# Patient Record
Sex: MALE | Race: BLACK OR AFRICAN AMERICAN | NOT HISPANIC OR LATINO | Employment: FULL TIME | URBAN - METROPOLITAN AREA
[De-identification: names, ages, dates, MRNs, and addresses within clinical notes are randomized per-mention and may not be internally consistent; named-entity substitution may affect disease eponyms.]

---
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
## 2023-07-25 ENCOUNTER — HOSPITAL ENCOUNTER (EMERGENCY)
Facility: CLINIC | Age: 23
End: 2023-07-25

## 2023-07-25 ENCOUNTER — HOSPITAL ENCOUNTER (EMERGENCY)
Facility: CLINIC | Age: 23
Discharge: HOME | End: 2023-07-26
Attending: EMERGENCY MEDICINE | Admitting: EMERGENCY MEDICINE
Payer: COMMERCIAL

## 2023-07-25 VITALS
OXYGEN SATURATION: 99 % | RESPIRATION RATE: 16 BRPM | BODY MASS INDEX: 21.66 KG/M2 | DIASTOLIC BLOOD PRESSURE: 67 MMHG | SYSTOLIC BLOOD PRESSURE: 124 MMHG | WEIGHT: 154.7 LBS | HEART RATE: 53 BPM | TEMPERATURE: 98.9 F | HEIGHT: 71 IN

## 2023-07-25 DIAGNOSIS — S69.92XA INJURY OF FINGER OF LEFT HAND, INITIAL ENCOUNTER: ICD-10-CM

## 2023-07-25 DIAGNOSIS — S69.92XA INJURY OF LEFT HAND, INITIAL ENCOUNTER: Primary | ICD-10-CM
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
## 2023-07-25 PROCEDURE — 99284 EMERGENCY DEPT VISIT MOD MDM: CPT | Mod: 25 | Performed by: EMERGENCY MEDICINE

## 2023-07-25 PROCEDURE — 29130 APPL FINGER SPLINT STATIC: CPT | Mod: F4 | Performed by: EMERGENCY MEDICINE

## 2023-07-25 PROCEDURE — 99283 EMERGENCY DEPT VISIT LOW MDM: CPT | Performed by: EMERGENCY MEDICINE

## 2023-07-25 ASSESSMENT — ACTIVITIES OF DAILY LIVING (ADL): ADLS_ACUITY_SCORE: 33

## 2023-07-26 ENCOUNTER — APPOINTMENT (OUTPATIENT)
Dept: GENERAL RADIOLOGY | Facility: CLINIC | Age: 23
End: 2023-07-26
Attending: EMERGENCY MEDICINE
Payer: COMMERCIAL

## 2023-07-26 PROCEDURE — 73140 X-RAY EXAM OF FINGER(S): CPT | Mod: LT
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
## 2023-07-26 NOTE — DISCHARGE INSTRUCTIONS
Use the splint. You can use tylenol or ibuprofen as needed for pain.     Please make an appointment to follow up with [facility]opedics Clinic - hand doctor (phone: [phone]) within a week. You may return with any worsening or concern.

## 2023-07-26 NOTE — ED TRIAGE NOTES
Triage Assessment       Row Name 07/25/23 4747       Respiratory WDL    Respiratory WDL WDL       Skin Circulation/Temperature WDL    Skin Circulation/Temperature WDL WDL

## 2023-07-27 ENCOUNTER — DOCUMENTATION ONLY (OUTPATIENT)
Dept: OTHER | Facility: CLINIC | Age: 23
End: 2023-07-27
Payer: COMMERCIAL

## 2023-12-23 ENCOUNTER — HOSPITAL ENCOUNTER (EMERGENCY)
Facility: CLINIC | Age: 23
Discharge: HOME | End: 2023-12-23
Attending: EMERGENCY MEDICINE | Admitting: EMERGENCY MEDICINE
Payer: COMMERCIAL

## 2023-12-23 VITALS
TEMPERATURE: 98.6 F | BODY MASS INDEX: 22.4 KG/M2 | HEART RATE: 58 BPM | OXYGEN SATURATION: 100 % | WEIGHT: 160 LBS | SYSTOLIC BLOOD PRESSURE: 121 MMHG | RESPIRATION RATE: 16 BRPM | HEIGHT: 71 IN | DIASTOLIC BLOOD PRESSURE: 77 MMHG

## 2023-12-23 DIAGNOSIS — B34.9 VIRAL SYNDROME: ICD-10-CM

## 2023-12-23 LAB
FLUAV RNA SPEC QL NAA+PROBE: NEGATIVE
FLUBV RNA RESP QL NAA+PROBE: NEGATIVE
RSV RNA SPEC NAA+PROBE: NEGATIVE
SARS-COV-2 RNA RESP QL NAA+PROBE: NEGATIVE

## 2023-12-23 PROCEDURE — 250N000011 HC RX IP 250 OP 636: Performed by: EMERGENCY MEDICINE

## 2023-12-23 PROCEDURE — 87637 SARSCOV2&INF A&B&RSV AMP PRB: CPT | Performed by: INTERNAL MEDICINE

## 2023-12-23 PROCEDURE — 99283 EMERGENCY DEPT VISIT LOW MDM: CPT | Performed by: EMERGENCY MEDICINE

## 2023-12-23 RX ORDER — ONDANSETRON 4 MG/1
4 TABLET, ORALLY DISINTEGRATING ORAL ONCE
Status: COMPLETED | OUTPATIENT
Start: 2023-12-23 | End: 2023-12-23

## 2023-12-23 RX ADMIN — ONDANSETRON 4 MG: 4 TABLET, ORALLY DISINTEGRATING ORAL at 17:19

## 2023-12-23 ASSESSMENT — ACTIVITIES OF DAILY LIVING (ADL): ADLS_ACUITY_SCORE: 35

## 2023-12-23 NOTE — ED PROVIDER NOTES
ED Provider Note  [facility]      History     Chief Complaint   Patient presents with    Fever     102s for the past 2 days    Cough     HPI  Gatito Luna is a 23 year old male who presents to the emergency department seeking evaluation of cold & Flu symptoms. She reports that beginning 2 days ago she has been experiencing chest pain, sore throat, runny nose, coughing, shortness of breath and fever. He reports that his fever was up to 102F. He is not vaccinated against Covid-19. He states that he has had Covid-19 in the past. He also adds that he has been vomiting, with 1 episode today. He has been taking tylenol and Advil to manage his symptoms, but has not found significant relief.        Past Medical History  Past Medical History:   Diagnosis Date    DENTAL CARIES/ MULTIPLE 11/17/2006    Wheezing 1/31/03     History reviewed. No pertinent surgical history.  ACETAMINOPHEN 160 MG/5ML OR LIQD  ibuprofen (ADVIL,MOTRIN) 200 MG tablet  cyclobenzaprine (FLEXERIL) 10 MG tablet  IBUPROFEN 100 MG/5ML OR SUSP  IBUPROFEN PO  oxyCODONE (ROXICODONE) 5 MG immediate release tablet      Allergies   Allergen Reactions    Shrimp Swelling     Family History  History reviewed. No pertinent family history.  Social History   Social History     Tobacco Use    Smoking status: Never   Substance Use Topics    Alcohol use: Not Currently    Drug use: Never      Past medical history, past surgical history, medications, allergies, family history, and social history were reviewed with the patient. No additional pertinent items.      A medically appropriate review of systems was performed with pertinent positives and negatives noted in the HPI, and all other systems negative.    Physical Exam      Physical Exam  Constitutional:       General: He is not in acute distress.     Appearance: He is not ill-appearing, toxic-appearing or diaphoretic.   HENT:      Head: Normocephalic and atraumatic.      Nose: Congestion  present.      Mouth/Throat:      Pharynx: No oropharyngeal exudate or posterior oropharyngeal erythema.   Cardiovascular:      Pulses: Normal pulses.   Pulmonary:      Effort: Pulmonary effort is normal. No respiratory distress.      Breath sounds: No wheezing.      Comments: Sats 100%  Neurological:      General: No focal deficit present.      Mental Status: He is oriented to person, place, and time.   Psychiatric:         Mood and Affect: Mood normal.         Behavior: Behavior normal.         Thought Content: Thought content normal.           ED Course, Procedures, & Data      Procedures               Results for orders placed or performed during the hospital encounter of 12/23/23   Symptomatic Influenza A/B, RSV, & SARS-CoV2 PCR (COVID-19) Nasopharyngeal     Status: Normal    Specimen: Nasopharyngeal; Swab   Result Value Ref Range    Influenza A PCR Negative Negative    Influenza B PCR Negative Negative    RSV PCR Negative Negative    SARS CoV2 PCR Negative Negative    Narrative    Testing was performed using the Xpert Xpress CoV2/Flu/RSV Assay on the [employer] GeneXpert Instrument. This test should be ordered for the detection of SARS-CoV-2, influenza, and RSV viruses in individuals who meet clinical and/or epidemiological criteria. Test performance is unknown in asymptomatic patients. This test is for in vitro diagnostic use under the FDA EUA for laboratories certified under CLIA to perform high or moderate complexity testing. This test has not been FDA cleared or approved. A negative result does not rule out the presence of PCR inhibitors in the specimen or target RNA in concentration below the limit of detection for the assay. If only one viral target is positive but coinfection with multiple targets is suspected, the sample should be re-tested with another FDA cleared, approved, or authorized test, if coinfection would change clinical management. This test was validated by the [facility].  These laboratories are certified under the Clinical Laboratory Improvement Amendments of 1988 (CLIA-88) as qualified to perform high complexity laboratory testing.     Medications   ondansetron (ZOFRAN ODT) ODT tab 4 mg (4 mg Oral Not Given 12/23/23 1805)   ondansetron (ZOFRAN ODT) ODT tab 4 mg (4 mg Oral $Given 12/23/23 1719)            No results found for any visits on 12/23/23.  Medications - No data to display  Labs Ordered and Resulted from Time of ED Arrival to Time of ED Departure - No data to display  No orders to display          Critical care was not performed.     Medical Decision Making  The patient's presentation was of low complexity (an acute and uncomplicated illness or injury).    The patient's evaluation involved:  ordering and/or review of 2 test(s) in this encounter (covid, flu PCR)    The patient's management necessitated moderate risk (prescription drug management including medications given in the ED).    Assessment & Plan    23-year-old male who presents to the ER due to 24 hours of cough congestion and low-grade fever.  Patient here is stable vital signs.  Patient declined wanting a chest x-ray.  His breath sounds were stable and his oxygenation was normal.  Patient was evaluated for COVID-19 and influenza.  Both were found to be negative.  Patient received an oral Zofran and was able to drink liquids in the ER.  He is overall feeling well.  No signs of serious illness.  Patient likely has a viral syndrome that is causing his symptoms.  Plan to discharge home with outpatient follow-up.    I have reviewed the nursing notes. I have reviewed the findings, diagnosis, plan and need for follow up with the patient.    New Prescriptions    No medications on file       Final diagnoses:   Viral syndrome   Froy CAZARES, am serving as a trained medical scribe to document services personally performed by Louisa Buchanan MD, based on the provider's statements to me.     Louisa CAZARES  MD Chanel, was physically present and have reviewed and verified the accuracy of this note documented by Froy Norris.      Louisa Buchanan MD  [facility] EMERGENCY DEPARTMENT  12/23/2023     Louisa Buchanan MD  12/23/23 2023       Louisa Buchanan MD  12/23/23 2023

## 2023-12-23 NOTE — ED TRIAGE NOTES
Pt states he started having a sore throat, fever, headache, runny nose, cough, nausea and vomiting 1-2 days ago. Pt states he last took tylenol/Ibuprofen around 1pm today. States he was around a friend recently who had the flu.     Triage Assessment (Adult)       Row Name 12/23/23 1257          Triage Assessment    Airway WDL WDL        Respiratory WDL    Respiratory WDL WDL        Skin Circulation/Temperature WDL    Skin Circulation/Temperature WDL WDL        Cardiac WDL    Cardiac WDL WDL        Peripheral/Neurovascular WDL    Peripheral Neurovascular WDL WDL        Cognitive/Neuro/Behavioral WDL    Cognitive/Neuro/Behavioral WDL WDL

## 2023-12-23 NOTE — DISCHARGE INSTRUCTIONS
You do not have covid or the flu.     You have a nonspecific virus causing your symptoms.     Take tylenol or ibuprofen as needed for body aches and fever.     Drink plenty of fluids.

## 2024-03-04 ENCOUNTER — HOSPITAL ENCOUNTER (EMERGENCY)
Facility: CLINIC | Age: 24
Discharge: HOME | End: 2024-03-04
Attending: EMERGENCY MEDICINE | Admitting: EMERGENCY MEDICINE
Payer: COMMERCIAL

## 2024-03-04 ENCOUNTER — APPOINTMENT (OUTPATIENT)
Dept: GENERAL RADIOLOGY | Facility: CLINIC | Age: 24
End: 2024-03-04
Attending: EMERGENCY MEDICINE
Payer: COMMERCIAL

## 2024-03-04 VITALS
HEIGHT: 71 IN | DIASTOLIC BLOOD PRESSURE: 73 MMHG | RESPIRATION RATE: 16 BRPM | WEIGHT: 150 LBS | HEART RATE: 55 BPM | BODY MASS INDEX: 21 KG/M2 | SYSTOLIC BLOOD PRESSURE: 111 MMHG | OXYGEN SATURATION: 99 % | TEMPERATURE: 97.7 F

## 2024-03-04 DIAGNOSIS — U07.1 COVID-19 VIRUS INFECTION: ICD-10-CM

## 2024-03-04 DIAGNOSIS — J10.1 INFLUENZA B: ICD-10-CM

## 2024-03-04 LAB
ALBUMIN SERPL BCG-MCNC: 4.6 G/DL (ref 3.5–5.2)
ALP SERPL-CCNC: 75 U/L (ref 40–150)
ALT SERPL W P-5'-P-CCNC: 20 U/L (ref 0–70)
ANION GAP SERPL CALCULATED.3IONS-SCNC: 9 MMOL/L (ref 7–15)
AST SERPL W P-5'-P-CCNC: 38 U/L (ref 0–45)
BASOPHILS # BLD AUTO: 0 10E3/UL (ref 0–0.2)
BASOPHILS NFR BLD AUTO: 0 %
BILIRUB SERPL-MCNC: 0.5 MG/DL
BUN SERPL-MCNC: 6.9 MG/DL (ref 6–20)
CALCIUM SERPL-MCNC: 9.1 MG/DL (ref 8.6–10)
CHLORIDE SERPL-SCNC: 99 MMOL/L (ref 98–107)
CREAT SERPL-MCNC: 0.94 MG/DL (ref 0.67–1.17)
DEPRECATED HCO3 PLAS-SCNC: 32 MMOL/L (ref 22–29)
EGFRCR SERPLBLD CKD-EPI 2021: >90 ML/MIN/1.73M2
EOSINOPHIL # BLD AUTO: 0 10E3/UL (ref 0–0.7)
EOSINOPHIL NFR BLD AUTO: 1 %
ERYTHROCYTE [DISTWIDTH] IN BLOOD BY AUTOMATED COUNT: 12.1 % (ref 10–15)
FLUAV RNA SPEC QL NAA+PROBE: NEGATIVE
FLUBV RNA RESP QL NAA+PROBE: POSITIVE
GLUCOSE SERPL-MCNC: 92 MG/DL (ref 70–99)
HCT VFR BLD AUTO: 45.6 % (ref 40–53)
HGB BLD-MCNC: 15.3 G/DL (ref 13.3–17.7)
HOLD SPECIMEN: NORMAL
HOLD SPECIMEN: NORMAL
IMM GRANULOCYTES # BLD: 0 10E3/UL
IMM GRANULOCYTES NFR BLD: 0 %
LACTATE SERPL-SCNC: 1.5 MMOL/L (ref 0.7–2)
LYMPHOCYTES # BLD AUTO: 0.9 10E3/UL (ref 0.8–5.3)
LYMPHOCYTES NFR BLD AUTO: 29 %
MCH RBC QN AUTO: 29.5 PG (ref 26.5–33)
MCHC RBC AUTO-ENTMCNC: 33.6 G/DL (ref 31.5–36.5)
MCV RBC AUTO: 88 FL (ref 78–100)
MONOCYTES # BLD AUTO: 0.4 10E3/UL (ref 0–1.3)
MONOCYTES NFR BLD AUTO: 12 %
NEUTROPHILS # BLD AUTO: 1.7 10E3/UL (ref 1.6–8.3)
NEUTROPHILS NFR BLD AUTO: 58 %
NRBC # BLD AUTO: 0 10E3/UL
NRBC BLD AUTO-RTO: 0 /100
PLATELET # BLD AUTO: 171 10E3/UL (ref 150–450)
POTASSIUM SERPL-SCNC: 4 MMOL/L (ref 3.4–5.3)
PROT SERPL-MCNC: 7.4 G/DL (ref 6.4–8.3)
RBC # BLD AUTO: 5.19 10E6/UL (ref 4.4–5.9)
RSV RNA SPEC NAA+PROBE: NEGATIVE
SARS-COV-2 RNA RESP QL NAA+PROBE: POSITIVE
SODIUM SERPL-SCNC: 140 MMOL/L (ref 135–145)
WBC # BLD AUTO: 2.9 10E3/UL (ref 4–11)

## 2024-03-04 PROCEDURE — 99285 EMERGENCY DEPT VISIT HI MDM: CPT | Mod: 25 | Performed by: EMERGENCY MEDICINE

## 2024-03-04 PROCEDURE — 99284 EMERGENCY DEPT VISIT MOD MDM: CPT | Mod: 25 | Performed by: EMERGENCY MEDICINE

## 2024-03-04 PROCEDURE — 80053 COMPREHEN METABOLIC PANEL: CPT | Performed by: EMERGENCY MEDICINE

## 2024-03-04 PROCEDURE — 83605 ASSAY OF LACTIC ACID: CPT | Performed by: EMERGENCY MEDICINE

## 2024-03-04 PROCEDURE — 87637 SARSCOV2&INF A&B&RSV AMP PRB: CPT | Performed by: EMERGENCY MEDICINE

## 2024-03-04 PROCEDURE — 36415 COLL VENOUS BLD VENIPUNCTURE: CPT | Performed by: EMERGENCY MEDICINE

## 2024-03-04 PROCEDURE — 93010 ELECTROCARDIOGRAM REPORT: CPT | Performed by: EMERGENCY MEDICINE

## 2024-03-04 PROCEDURE — 93005 ELECTROCARDIOGRAM TRACING: CPT | Performed by: EMERGENCY MEDICINE

## 2024-03-04 PROCEDURE — 71046 X-RAY EXAM CHEST 2 VIEWS: CPT

## 2024-03-04 PROCEDURE — 250N000013 HC RX MED GY IP 250 OP 250 PS 637: Performed by: EMERGENCY MEDICINE

## 2024-03-04 PROCEDURE — 85041 AUTOMATED RBC COUNT: CPT | Performed by: EMERGENCY MEDICINE

## 2024-03-04 PROCEDURE — 250N000011 HC RX IP 250 OP 636: Performed by: EMERGENCY MEDICINE

## 2024-03-04 RX ORDER — IBUPROFEN 600 MG/1
600 TABLET, FILM COATED ORAL ONCE
Status: COMPLETED | OUTPATIENT
Start: 2024-03-04 | End: 2024-03-04

## 2024-03-04 RX ORDER — ACETAMINOPHEN 500 MG
1000 TABLET ORAL ONCE
Status: COMPLETED | OUTPATIENT
Start: 2024-03-04 | End: 2024-03-04

## 2024-03-04 RX ORDER — ONDANSETRON 4 MG/1
4 TABLET, ORALLY DISINTEGRATING ORAL ONCE
Status: COMPLETED | OUTPATIENT
Start: 2024-03-04 | End: 2024-03-04

## 2024-03-04 RX ADMIN — ACETAMINOPHEN 1000 MG: 500 TABLET ORAL at 13:18

## 2024-03-04 RX ADMIN — IBUPROFEN 600 MG: 600 TABLET, FILM COATED ORAL at 15:44

## 2024-03-04 RX ADMIN — ONDANSETRON 4 MG: 4 TABLET, ORALLY DISINTEGRATING ORAL at 13:18

## 2024-03-04 ASSESSMENT — ACTIVITIES OF DAILY LIVING (ADL)
ADLS_ACUITY_SCORE: 35
ADLS_ACUITY_SCORE: 33
ADLS_ACUITY_SCORE: 35

## 2024-03-04 ASSESSMENT — COLUMBIA-SUICIDE SEVERITY RATING SCALE - C-SSRS
2. HAVE YOU ACTUALLY HAD ANY THOUGHTS OF KILLING YOURSELF IN THE PAST MONTH?: NO
6. HAVE YOU EVER DONE ANYTHING, STARTED TO DO ANYTHING, OR PREPARED TO DO ANYTHING TO END YOUR LIFE?: NO
1. IN THE PAST MONTH, HAVE YOU WISHED YOU WERE DEAD OR WISHED YOU COULD GO TO SLEEP AND NOT WAKE UP?: NO

## 2024-03-04 NOTE — DISCHARGE INSTRUCTIONS
Your bloodwork and xray were not concerning for bacterial infection or severe disease.  It did not show signs of dehydration.    You were tested positive for influenza B and COVID-19.  We discussed treatment options for this and opted for supportive care at home.    Continue to drink plenty fluids and get rest.  Wear a mask and wash your hands often.  Stay in isolation, for as long as you have fevers.  After 24 hours of being fever free, and your symptoms start to improve, you can discontinue the isolation.  However continue wearing a mask and washing her hands often to protect others.  Make sure to get vaccinated next year.    For discomfort, you can take up to 1000 mg of Tylenol once every 6 hours.  You can take 600 mg of ibuprofen with food once every 6 hours.  You can alternate and overlap these as needed.  Return to the emergency department if you are having difficulty breathing, you have persistent vomiting and cannot even sip fluid, or if you have concerns.

## 2024-03-04 NOTE — ED TRIAGE NOTES
Began feeling ill when he got home from [facility] 6 days ago.  Onset of cough, vomiting, HA, runny nose muscle aches, dizziness. Lights bother his eyes.  Reports he hasn't been eating the last couple of days d/t vomiting but does have an appetite.  No blood in the vomit.      Unknown sick contacts.      HA 4/10 currently.    Took Advil and tylenol last dose was last night.

## 2024-03-04 NOTE — ED PROVIDER NOTES
[facility] EMERGENCY DEPARTMENT ([facility])    3/04/24      ED PROVIDER NOTE    History     Chief Complaint   Patient presents with    Flu Symptoms     HPI  Gatito Luna is a 24 year old male without pertinent PMH who presents to the Emergency Department with flu symptoms. Two days ago he began having a dry cough and dry throat.  He had a fever of 100.2.   He had an episode of vomiting.  Positive rhinorrhea and generalized muscle aches.  He took advil and advil yesterday.  He is not vaccinated for COVID or flu.     Past Medical History  Past Medical History:   Diagnosis Date    DENTAL CARIES/ MULTIPLE 11/17/2006    Wheezing 1/31/03     History reviewed. No pertinent surgical history.  ACETAMINOPHEN 160 MG/5ML OR LIQD  cyclobenzaprine (FLEXERIL) 10 MG tablet  ibuprofen (ADVIL,MOTRIN) 200 MG tablet  IBUPROFEN 100 MG/5ML OR SUSP  IBUPROFEN PO  oxyCODONE (ROXICODONE) 5 MG immediate release tablet      Allergies   Allergen Reactions    Shrimp Swelling     Family History  No family history on file.  Social History   Social History     Tobacco Use    Smoking status: Never   Substance Use Topics    Alcohol use: Not Currently    Drug use: Never         A complete review of systems was performed with pertinent positives and negatives noted in the HPI, and all other systems negative.    Physical Exam   BP: 112/68  Pulse: 79  Temp: (!) 100.7  F (38.2  C)  Resp: 18  Weight: 68 kg (150 lb)  SpO2: 98 %  Physical Exam  General:  No acute distress.  HENT:  Normocephalic and atraumatic.   Nasal congestion.  Eyes: EOMI. Conjunctivae normal.   Cardiovascular: Normal rate and regular rhythm.  Normal heart sounds. No murmur heard.  Pulmonary:  No respiratory distress. Normal breath sounds.   Abdominal: There is no distension.  Abdomen is soft. There is no mass.  There is no abdominal tenderness.   Musculoskeletal: No swelling or tenderness.  Moving all extremities spontaneously.    Skin: Warm and dry  Neurological: No focal  deficit present.   Mood and Affect: Mood normal.     ED Course, Procedures, & Data      Procedures               No results found for any visits on 03/04/24.  Medications   acetaminophen (TYLENOL) tablet 1,000 mg (1,000 mg Oral $Given 3/4/24 1318)   ondansetron (ZOFRAN ODT) ODT tab 4 mg (4 mg Oral $Given 3/4/24 1318)     Labs Ordered and Resulted from Time of ED Arrival to Time of ED Departure - No data to display  No orders to display          Critical care was not performed.     Medical Decision Making  The patient's presentation was of high complexity (an acute health issue posing potential threat to life or bodily function).  The patient's evaluation involved:  review of external note(s) from 3+ sources (see separate area of note for details)  review of 3+ test result(s) ordered prior to this encounter (see separate area of note for details)  ordering and/or review of 3+ test(s) in this encounter (see separate area of note for details)  independent interpretation of testing performed by another health professional (see separate area of note for details)     The patient's management necessitated only low risk treatment.    Assessment & Plan    Patient presents for 2 days of flu like illness.  He had a fever of 38.2.  Bloodwork unremarkable.  Chest xray reviewed by me, and no acute abnormalities.  Positive for influenza B and COVID.  He was given tylenol, motrin, zofran with relief.  He was able to tolerate PO and denied any severe symptoms.  Risks and benefits discussed regarding treatment for flu and COVID, which ultimately  he declined.  He was discharged with return precautions.    I have reviewed the nursing notes. I have reviewed the findings, diagnosis, plan and need for follow up with the patient.    New Prescriptions    No medications on file       Final diagnoses:   None     [facility] EMERGENCY DEPARTMENT  3/4/2024           Moon Nogueira MD  03/12/24 [id number]

## 2024-03-04 NOTE — ED TRIAGE NOTES
Triage Assessment (Adult)       Row Name 03/04/24 1319          Triage Assessment    Airway WDL WDL        Respiratory WDL    Respiratory WDL WDL        Skin Circulation/Temperature WDL    Skin Circulation/Temperature WDL WDL        Cardiac WDL    Cardiac WDL WDL        Peripheral/Neurovascular WDL    Peripheral Neurovascular WDL WDL

## 2024-05-13 ENCOUNTER — HOSPITAL ENCOUNTER (EMERGENCY)
Facility: CLINIC | Age: 24
Discharge: HOME | End: 2024-05-13
Attending: STUDENT IN AN ORGANIZED HEALTH CARE EDUCATION/TRAINING PROGRAM | Admitting: STUDENT IN AN ORGANIZED HEALTH CARE EDUCATION/TRAINING PROGRAM
Payer: COMMERCIAL

## 2024-05-13 ENCOUNTER — APPOINTMENT (OUTPATIENT)
Dept: GENERAL RADIOLOGY | Facility: CLINIC | Age: 24
End: 2024-05-13
Attending: STUDENT IN AN ORGANIZED HEALTH CARE EDUCATION/TRAINING PROGRAM
Payer: COMMERCIAL

## 2024-05-13 VITALS
DIASTOLIC BLOOD PRESSURE: 65 MMHG | HEART RATE: 53 BPM | OXYGEN SATURATION: 96 % | RESPIRATION RATE: 16 BRPM | SYSTOLIC BLOOD PRESSURE: 126 MMHG | TEMPERATURE: 98.6 F

## 2024-05-13 DIAGNOSIS — S43.004A SHOULDER DISLOCATION, RIGHT, INITIAL ENCOUNTER: ICD-10-CM

## 2024-05-13 PROCEDURE — 99283 EMERGENCY DEPT VISIT LOW MDM: CPT | Performed by: STUDENT IN AN ORGANIZED HEALTH CARE EDUCATION/TRAINING PROGRAM

## 2024-05-13 PROCEDURE — 73030 X-RAY EXAM OF SHOULDER: CPT | Mod: RT

## 2024-05-13 PROCEDURE — 250N000013 HC RX MED GY IP 250 OP 250 PS 637: Performed by: STUDENT IN AN ORGANIZED HEALTH CARE EDUCATION/TRAINING PROGRAM

## 2024-05-13 RX ORDER — ACETAMINOPHEN 325 MG/1
975 TABLET ORAL ONCE
Status: COMPLETED | OUTPATIENT
Start: 2024-05-13 | End: 2024-05-13

## 2024-05-13 RX ORDER — IBUPROFEN 600 MG/1
600 TABLET, FILM COATED ORAL ONCE
Status: COMPLETED | OUTPATIENT
Start: 2024-05-13 | End: 2024-05-13

## 2024-05-13 RX ADMIN — IBUPROFEN 600 MG: 600 TABLET, FILM COATED ORAL at 21:55

## 2024-05-13 RX ADMIN — ACETAMINOPHEN 975 MG: 325 TABLET, FILM COATED ORAL at 21:55

## 2024-05-13 ASSESSMENT — COLUMBIA-SUICIDE SEVERITY RATING SCALE - C-SSRS
1. IN THE PAST MONTH, HAVE YOU WISHED YOU WERE DEAD OR WISHED YOU COULD GO TO SLEEP AND NOT WAKE UP?: NO
2. HAVE YOU ACTUALLY HAD ANY THOUGHTS OF KILLING YOURSELF IN THE PAST MONTH?: NO
6. HAVE YOU EVER DONE ANYTHING, STARTED TO DO ANYTHING, OR PREPARED TO DO ANYTHING TO END YOUR LIFE?: NO

## 2024-05-13 ASSESSMENT — ACTIVITIES OF DAILY LIVING (ADL): ADLS_ACUITY_SCORE: 33

## 2024-05-14 NOTE — ED TRIAGE NOTES
Triage Assessment (Adult)       Row Name 05/13/24 2019          Triage Assessment    Airway WDL WDL        Respiratory WDL    Respiratory WDL WDL        Skin Circulation/Temperature WDL    Skin Circulation/Temperature WDL WDL        Cardiac WDL    Cardiac WDL WDL        Peripheral/Neurovascular WDL    Peripheral Neurovascular WDL WDL        Cognitive/Neuro/Behavioral WDL    Cognitive/Neuro/Behavioral WDL WDL

## 2024-05-14 NOTE — ED PROVIDER NOTES
"    [facility] EMERGENCY DEPARTMENT ([facility])    5/13/24      ED PROVIDER NOTE  VTA B    History     Chief Complaint   Patient presents with    Shoulder Injury     Playing basketball and \"popped\" his right shoulder after being hit in his shoulder by someone else's elbow.  He believes it may have been popped back in place as it isn't sticking out as much as when injury initially happened.     HPI  Gatito KOSTAS Luna is a 24 year old male without significant medical history who presents with shoulder injury.     Patient was playing basketball just prior to arrival.  States that a fairly big player ran into his shoulder on the right side, and he noted an immediate popping sensation and felt as though his shoulder was out of socket.  His friend who comes with him shows a photo of him with an obviously displaced shoulder consistent with dislocation, and patient notes that shortly after this photo was taken, as he went to stand up he felt it slowly go back into place.  Does note some tingling and numbness over the upper shoulder, no significant ongoing pain at this point in time but is feeling a little bit like he has limited range of motion still.        Past Medical History  Past Medical History:   Diagnosis Date    DENTAL CARIES/ MULTIPLE 11/17/2006    Wheezing 1/31/03     No past surgical history on file.  ACETAMINOPHEN 160 MG/5ML OR LIQD  cyclobenzaprine (FLEXERIL) 10 MG tablet  ibuprofen (ADVIL,MOTRIN) 200 MG tablet  IBUPROFEN 100 MG/5ML OR SUSP  IBUPROFEN PO  oxyCODONE (ROXICODONE) 5 MG immediate release tablet      Allergies   Allergen Reactions    Shrimp Swelling     Family History  No family history on file.  Social History   Social History     Tobacco Use    Smoking status: Never   Substance Use Topics    Alcohol use: Not Currently    Drug use: Never      Past medical history, past surgical history, medications, allergies, family history, and social history were reviewed with the patient. No additional " pertinent items.      A medically appropriate review of systems was performed with pertinent positives and negatives noted in the HPI, and all other systems negative.    Physical Exam   BP: 126/65  Pulse: 53  Temp: 98.6  F (37  C)  Resp: 16  SpO2: 96 %  Physical Exam  GEN: Well appearing, non toxic, cooperative  HEENT: normocephalic and atraumatic, PERRLA, EOMI  CV: well-perfused, normal skin color for ethnicity  PULM: breathing comfortably, in no respiratory distress  ABD: nondistended  EXT: Full range of motion.  No edema.  Preserved range of motion with a ability to touch the left upper arm on the opposite side, and no pain on palpation of the hand, forearm or upper humerus with no abnormalities of range of motion of the remainder of his joints.  No clavicular tenderness to palpation.  Does have some hesitation with  range of motion  NEURO: awake, conversant, grossly normal bilateral upper and lower extremity strength & ROM   SKIN: No rashes, ecchymosis, or lacerations  PSYCH: Calm and cooperative, interactive    ED Course, Procedures, & Data      Procedures        Results for orders placed or performed during the hospital encounter of 05/13/24   XR Shoulder Right 3 Views     Status: None    Narrative    EXAM: XR SHOULDER RIGHT G/E 3 VIEWS  LOCATION: [facility]  DATE: 5/13/2024    INDICATION: shoulder injury playing basketball  COMPARISON: None.      Impression    IMPRESSION: No acute fracture or malalignment. There is normal joint spacing.     Medications   acetaminophen (TYLENOL) tablet 975 mg (has no administration in time range)   ibuprofen (ADVIL/MOTRIN) tablet 600 mg (has no administration in time range)     Labs Ordered and Resulted from Time of ED Arrival to Time of ED Departure - No data to display  XR Shoulder Right 3 Views   Final Result   IMPRESSION: No acute fracture or malalignment. There is normal joint spacing.             Critical care was not  performed.     Medical Decision Making  The patient's presentation was of low complexity (an acute and uncomplicated illness or injury).    The patient's evaluation involved:  review of external note(s) from 3+ sources (see separate area of note for details)  review of 3+ test result(s) ordered prior to this encounter (see separate area of note for details)  ordering and/or review of 1 test(s) in this encounter (see separate area of note for details)    The patient's management necessitated only low risk treatment.    Assessment & Plan    24-year-old male presents emergency department due to a shoulder injury with images taken by his friend prior to arrival with obvious dislocation/deformity noted at that time, now resolved, patient feeling as though he is back to his baseline although he does have some axillary nerve numbness, but is able to range his arm.    Otherwise neurovascularly intact.  X-ray demonstrates no evidence of any acute fractures and is properly aligned at this point in time.  Will discharge with patient in sling, and outpatient orthopedic follow-up    I have reviewed the nursing notes. I have reviewed the findings, diagnosis, plan and need for follow up with the patient.    New Prescriptions    No medications on file       Final diagnoses:   Shoulder dislocation, right, initial encounter       Whitney Ibarra MD   [facility] EMERGENCY DEPARTMENT  5/13/2024     Whitney Ibarra MD  05/13/24 0500

## 2024-05-14 NOTE — DISCHARGE INSTRUCTIONS
You were seen in the department due to a shoulder injury.  Your x-ray is normal here and shows that your shoulder is in the proper alignment, but based on the imaging and the story, it is consistent with you having had a shoulder dislocation before you arrived here.    We do recommend that you wear the sling that you were given most times that you are up and moving around and at night while you are sleeping.  4-5 times a day, we would recommend that you take your arm out of the sling, and do range of motion exercises of your hand, and elbow but try to keep your elbow tucked toward your body and limit extreme movements of your arm to the outside to avoid Haven dislocating it.    We recommend that it be in the sling for the next 1 to 2 weeks.  Please follow-up with orthopedics/sports medicine in the next 1 to 2 weeks for reevaluation

## 2024-05-16 ENCOUNTER — OFFICE VISIT (OUTPATIENT)
Dept: ORTHOPEDICS | Facility: CLINIC | Age: 24
End: 2024-05-16
Attending: STUDENT IN AN ORGANIZED HEALTH CARE EDUCATION/TRAINING PROGRAM
Payer: COMMERCIAL

## 2024-05-16 ENCOUNTER — PRE VISIT (OUTPATIENT)
Dept: ORTHOPEDICS | Facility: CLINIC | Age: 24
End: 2024-05-16

## 2024-05-16 DIAGNOSIS — S43.004A SHOULDER DISLOCATION, RIGHT, INITIAL ENCOUNTER: ICD-10-CM

## 2024-05-16 PROCEDURE — 99203 OFFICE O/P NEW LOW 30 MIN: CPT | Performed by: FAMILY MEDICINE

## 2024-05-16 NOTE — LETTER
5/16/2024      RE: Gatito Luna  [address]  [place] [facility] [address]     Dear Colleague,    Thank you for referring your patient, Gatito Luna, to the [facility] SPORTS MEDICINE [facility]. Please see a copy of my visit note below.      [facility]  SPORTS & ORTHOPEDIC CLINIC VISIT     May 16, 2024        ASSESSMENT & PLAN    24-year-old 3 days status post right anterior shoulder dislocation.  First-time dislocation.  No obvious fracture or bony lesion on radiographs    Reviewed imaging and assessment with patient in detail  Provided with a sling.  Recommended rest for the next 1 to 2 weeks followed by physical therapy.  Would recommend follow-up in clinic in 6 weeks.    Andrey Alvarado MD  [facility] SP[facility]    -----  Chief Complaint   Patient presents with     Right Shoulder - Pain       SUBJECTIVE  Gatito Luna is a/an 24 year old male who is seen as an ER referral for evaluation of  right shoulder injury.     The patient is seen by themselves.  The patient is Right handed    Onset: 5/13/24. Patient describes injury as playing basketball and was going to block someone and dislocated shoulder.   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 No  Do you have any vision problems? No  Do you have any chest pain or edema? No  Do you have any shortness of breath or wheezing?  No  Do you have stomach problems? No  Do you have any numbness or focal weakness? No  Do you have diabetes? No  Do you have problems with bleeding or clotting? No  Do you have an rashes or other skin lesions? No    OBJECTIVE:  There were no vitals taken for this  visit.     EXAM:  Alert, pleasant and conversational      right shoulder:   Skin intact. No skin changes, deformity or atrophy    PROM:   Tested gently with no mechanical restriction.  Relatively comfortable below shoulder height.    Strength testing: Grossly tested, not particularly painful  Abduction: 5/5,   External rotation: 5/5   Internal rotation 5/5     Deltoids 5/5, Biceps 5/5, Triceps 5/5,  5/5.    Palpation: negative TTP of the Acromioclavicular joint  negative TTP of Sternoclavicular joint  negative TTP of posterior glenoid  negative TTP of scapular borders  negative TTP of the bicipital tendon.     Special Tests:   None    Some numbness over the lateral shoulder.  No paresthesias in the hand or forearm.      RADIOLOGY:    3 view xrays of right shoulder performed 5/13/2024 and reviewed independently demonstrating no acute fracture or dislocation.  No significant DJD. See EMR for formal radiology report.              Again, thank you for allowing me to participate in the care of your patient.      Sincerely,    Andrey Alvarado MD

## 2024-05-16 NOTE — TELEPHONE ENCOUNTER
DIAGNOSIS: Shoulder dislocation, right,//xr/ucare/ortho cons     APPOINTMENT DATE: 5.16.24   NOTES STATUS DETAILS   DISCHARGE REPORT from the ER Internal 5.13.24  Fred  [facility]   MEDICATION LIST Internal    XRAYS (IMAGES & REPORTS) Internal 5.13.24  XR Shoulder Right

## 2024-05-16 NOTE — PROGRESS NOTES
[facility]  SPORTS & ORTHOPEDIC CLINIC VISIT     May 16, 2024        ASSESSMENT & PLAN    24-year-old 3 days status post right anterior shoulder dislocation.  First-time dislocation.  No obvious fracture or bony lesion on radiographs    Reviewed imaging and assessment with patient in detail  Provided with a sling.  Recommended rest for the next 1 to 2 weeks followed by physical therapy.  Would recommend follow-up in clinic in 6 weeks.    Andrey Alvarado MD  [facility] SPORTS MEDIC[facility]    -----  Chief Complaint   Patient presents with    Right Shoulder - Pain       SUBJECTIVE  Gatito KOSTAS Luna is a/an 24 year old male who is seen as an ER referral for evaluation of  right shoulder injury.     The patient is seen by themselves.  The patient is Right handed    Onset: 5/13/24. Patient describes injury as playing basketball and was going to block someone and dislocated shoulder.   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 No  Do you have any vision problems? No  Do you have any chest pain or edema? No  Do you have any shortness of breath or wheezing?  No  Do you have stomach problems? No  Do you have any numbness or focal weakness? No  Do you have diabetes? No  Do you have problems with bleeding or clotting? No  Do you have an rashes or other skin lesions? No    OBJECTIVE:  There were no vitals taken for this visit.     EXAM:  Alert, pleasant and conversational      right shoulder:   Skin intact. No skin changes, deformity or atrophy    PROM:   Tested gently with no mechanical restriction.  Relatively comfortable below shoulder height.    Strength testing: Grossly  tested, not particularly painful  Abduction: 5/5,   External rotation: 5/5   Internal rotation 5/5     Deltoids 5/5, Biceps 5/5, Triceps 5/5,  5/5.    Palpation: negative TTP of the Acromioclavicular joint  negative TTP of Sternoclavicular joint  negative TTP of posterior glenoid  negative TTP of scapular borders  negative TTP of the bicipital tendon.     Special Tests:   None    Some numbness over the lateral shoulder.  No paresthesias in the hand or forearm.      RADIOLOGY:    3 view xrays of right shoulder performed 5/13/2024 and reviewed independently demonstrating no acute fracture or dislocation.  No significant DJD. See EMR for formal radiology report.

## 2025-04-28 ENCOUNTER — HOSPITAL ENCOUNTER (EMERGENCY)
Facility: CLINIC | Age: 25
Discharge: HOME | End: 2025-04-29
Attending: EMERGENCY MEDICINE | Admitting: EMERGENCY MEDICINE

## 2025-04-28 ENCOUNTER — APPOINTMENT (OUTPATIENT)
Dept: ULTRASOUND IMAGING | Facility: CLINIC | Age: 25
End: 2025-04-28
Attending: EMERGENCY MEDICINE

## 2025-04-28 ENCOUNTER — APPOINTMENT (OUTPATIENT)
Dept: CT IMAGING | Facility: CLINIC | Age: 25
End: 2025-04-28
Attending: EMERGENCY MEDICINE

## 2025-04-28 DIAGNOSIS — N12 PYELONEPHRITIS: ICD-10-CM

## 2025-04-28 DIAGNOSIS — N30.01 ACUTE CYSTITIS WITH HEMATURIA: ICD-10-CM

## 2025-04-28 LAB
ALBUMIN SERPL BCG-MCNC: 4.6 G/DL (ref 3.5–5.2)
ALBUMIN UR-MCNC: NEGATIVE MG/DL
ALP SERPL-CCNC: 109 U/L (ref 40–150)
ALT SERPL W P-5'-P-CCNC: 41 U/L (ref 0–70)
ANION GAP SERPL CALCULATED.3IONS-SCNC: 12 MMOL/L (ref 7–15)
APPEARANCE UR: ABNORMAL
AST SERPL W P-5'-P-CCNC: 44 U/L (ref 0–45)
BASOPHILS # BLD AUTO: 0 10E3/UL (ref 0–0.2)
BASOPHILS NFR BLD AUTO: 0 %
BILIRUB SERPL-MCNC: 0.5 MG/DL
BILIRUB UR QL STRIP: NEGATIVE
BUN SERPL-MCNC: 11.3 MG/DL (ref 6–20)
CALCIUM SERPL-MCNC: 9.2 MG/DL (ref 8.8–10.4)
CHLORIDE SERPL-SCNC: 102 MMOL/L (ref 98–107)
COLOR UR AUTO: ABNORMAL
CREAT SERPL-MCNC: 1.03 MG/DL (ref 0.67–1.17)
EGFRCR SERPLBLD CKD-EPI 2021: >90 ML/MIN/1.73M2
EOSINOPHIL # BLD AUTO: 0 10E3/UL (ref 0–0.7)
EOSINOPHIL NFR BLD AUTO: 0 %
ERYTHROCYTE [DISTWIDTH] IN BLOOD BY AUTOMATED COUNT: 12 % (ref 10–15)
GLUCOSE SERPL-MCNC: 115 MG/DL (ref 70–99)
GLUCOSE UR STRIP-MCNC: NEGATIVE MG/DL
HCO3 SERPL-SCNC: 27 MMOL/L (ref 22–29)
HCT VFR BLD AUTO: 41 % (ref 40–53)
HGB BLD-MCNC: 14.5 G/DL (ref 13.3–17.7)
HGB UR QL STRIP: ABNORMAL
IMM GRANULOCYTES # BLD: 0 10E3/UL
IMM GRANULOCYTES NFR BLD: 0 %
KETONES UR STRIP-MCNC: 160 MG/DL
LEUKOCYTE ESTERASE UR QL STRIP: ABNORMAL
LYMPHOCYTES # BLD AUTO: 1.3 10E3/UL (ref 0.8–5.3)
LYMPHOCYTES NFR BLD AUTO: 9 %
MCH RBC QN AUTO: 30.6 PG (ref 26.5–33)
MCHC RBC AUTO-ENTMCNC: 35.4 G/DL (ref 31.5–36.5)
MCV RBC AUTO: 87 FL (ref 78–100)
MONOCYTES # BLD AUTO: 0.5 10E3/UL (ref 0–1.3)
MONOCYTES NFR BLD AUTO: 3 %
MUCOUS THREADS #/AREA URNS LPF: PRESENT /LPF
NEUTROPHILS # BLD AUTO: 12.4 10E3/UL (ref 1.6–8.3)
NEUTROPHILS NFR BLD AUTO: 87 %
NITRATE UR QL: NEGATIVE
NRBC # BLD AUTO: 0 10E3/UL
NRBC BLD AUTO-RTO: 0 /100
PH UR STRIP: 8 [PH] (ref 5–7)
PLATELET # BLD AUTO: 250 10E3/UL (ref 150–450)
POTASSIUM SERPL-SCNC: 3.9 MMOL/L (ref 3.4–5.3)
PROT SERPL-MCNC: 7.6 G/DL (ref 6.4–8.3)
RBC # BLD AUTO: 4.74 10E6/UL (ref 4.4–5.9)
RBC URINE: >100 /HPF
SODIUM SERPL-SCNC: 141 MMOL/L (ref 135–145)
SP GR UR STRIP: 1.01 (ref 1–1.03)
UROBILINOGEN UR STRIP-MCNC: 0.2 MG/DL
WBC # BLD AUTO: 14.3 10E3/UL (ref 4–11)
WBC URINE: 19 /HPF

## 2025-04-28 PROCEDURE — 81003 URINALYSIS AUTO W/O SCOPE: CPT | Performed by: EMERGENCY MEDICINE

## 2025-04-28 PROCEDURE — 96365 THER/PROPH/DIAG IV INF INIT: CPT | Mod: 59

## 2025-04-28 PROCEDURE — 99285 EMERGENCY DEPT VISIT HI MDM: CPT | Mod: 25

## 2025-04-28 PROCEDURE — 258N000003 HC RX IP 258 OP 636: Performed by: EMERGENCY MEDICINE

## 2025-04-28 PROCEDURE — 84155 ASSAY OF PROTEIN SERUM: CPT | Performed by: EMERGENCY MEDICINE

## 2025-04-28 PROCEDURE — 250N000011 HC RX IP 250 OP 636: Performed by: EMERGENCY MEDICINE

## 2025-04-28 PROCEDURE — 250N000013 HC RX MED GY IP 250 OP 250 PS 637: Performed by: EMERGENCY MEDICINE

## 2025-04-28 PROCEDURE — 87086 URINE CULTURE/COLONY COUNT: CPT | Performed by: EMERGENCY MEDICINE

## 2025-04-28 PROCEDURE — 85004 AUTOMATED DIFF WBC COUNT: CPT | Performed by: EMERGENCY MEDICINE

## 2025-04-28 PROCEDURE — 76870 US EXAM SCROTUM: CPT

## 2025-04-28 PROCEDURE — 96361 HYDRATE IV INFUSION ADD-ON: CPT

## 2025-04-28 PROCEDURE — 74177 CT ABD & PELVIS W/CONTRAST: CPT

## 2025-04-28 PROCEDURE — 36415 COLL VENOUS BLD VENIPUNCTURE: CPT | Performed by: EMERGENCY MEDICINE

## 2025-04-28 PROCEDURE — 250N000009 HC RX 250: Performed by: EMERGENCY MEDICINE

## 2025-04-28 PROCEDURE — 85025 COMPLETE CBC W/AUTO DIFF WBC: CPT | Performed by: EMERGENCY MEDICINE

## 2025-04-28 PROCEDURE — 96375 TX/PRO/DX INJ NEW DRUG ADDON: CPT

## 2025-04-28 RX ORDER — CEFTRIAXONE 2 G/1
2 INJECTION, POWDER, FOR SOLUTION INTRAMUSCULAR; INTRAVENOUS ONCE
Status: COMPLETED | OUTPATIENT
Start: 2025-04-28 | End: 2025-04-28

## 2025-04-28 RX ORDER — ACETAMINOPHEN 325 MG/1
975 TABLET ORAL ONCE
Status: COMPLETED | OUTPATIENT
Start: 2025-04-28 | End: 2025-04-28

## 2025-04-28 RX ORDER — IOPAMIDOL 755 MG/ML
75 INJECTION, SOLUTION INTRAVASCULAR ONCE
Status: COMPLETED | OUTPATIENT
Start: 2025-04-28 | End: 2025-04-28

## 2025-04-28 RX ORDER — KETOROLAC TROMETHAMINE 15 MG/ML
15 INJECTION, SOLUTION INTRAMUSCULAR; INTRAVENOUS ONCE
Status: COMPLETED | OUTPATIENT
Start: 2025-04-28 | End: 2025-04-28

## 2025-04-28 RX ORDER — ONDANSETRON 2 MG/ML
4 INJECTION INTRAMUSCULAR; INTRAVENOUS ONCE
Status: COMPLETED | OUTPATIENT
Start: 2025-04-28 | End: 2025-04-28

## 2025-04-28 RX ADMIN — ACETAMINOPHEN 975 MG: 325 TABLET, FILM COATED ORAL at 19:57

## 2025-04-28 RX ADMIN — KETOROLAC TROMETHAMINE 15 MG: 15 INJECTION, SOLUTION INTRAMUSCULAR; INTRAVENOUS at 20:46

## 2025-04-28 RX ADMIN — SODIUM CHLORIDE 62 ML: 9 INJECTION, SOLUTION INTRAVENOUS at 21:29

## 2025-04-28 RX ADMIN — SODIUM CHLORIDE 1000 ML: 0.9 INJECTION, SOLUTION INTRAVENOUS at 20:45

## 2025-04-28 RX ADMIN — ONDANSETRON 4 MG: 2 INJECTION, SOLUTION INTRAMUSCULAR; INTRAVENOUS at 20:46

## 2025-04-28 RX ADMIN — CEFTRIAXONE SODIUM 2 G: 2 INJECTION, POWDER, FOR SOLUTION INTRAMUSCULAR; INTRAVENOUS at 22:35

## 2025-04-28 RX ADMIN — IOPAMIDOL 75 ML: 755 INJECTION, SOLUTION INTRAVENOUS at 21:29

## 2025-04-28 ASSESSMENT — COLUMBIA-SUICIDE SEVERITY RATING SCALE - C-SSRS
1. IN THE PAST MONTH, HAVE YOU WISHED YOU WERE DEAD OR WISHED YOU COULD GO TO SLEEP AND NOT WAKE UP?: NO
6. HAVE YOU EVER DONE ANYTHING, STARTED TO DO ANYTHING, OR PREPARED TO DO ANYTHING TO END YOUR LIFE?: NO
2. HAVE YOU ACTUALLY HAD ANY THOUGHTS OF KILLING YOURSELF IN THE PAST MONTH?: NO

## 2025-04-28 ASSESSMENT — ACTIVITIES OF DAILY LIVING (ADL)
ADLS_ACUITY_SCORE: 41

## 2025-04-28 NOTE — ED TRIAGE NOTES
Pt presents to triage with friend with C/O RLQ abd pain, onset 330 pm this afternoon.      Triage Assessment (Adult)       Row Name 04/28/25 8534          Triage Assessment    Airway WDL WDL        Respiratory WDL    Respiratory WDL WDL        Skin Circulation/Temperature WDL    Skin Circulation/Temperature WDL WDL        Cardiac WDL    Cardiac WDL WDL        Peripheral/Neurovascular WDL    Peripheral Neurovascular WDL WDL        Cognitive/Neuro/Behavioral WDL    Cognitive/Neuro/Behavioral WDL WDL

## 2025-04-29 ENCOUNTER — PATIENT OUTREACH (OUTPATIENT)
Dept: CARE COORDINATION | Facility: CLINIC | Age: 25
End: 2025-04-29

## 2025-04-29 VITALS
RESPIRATION RATE: 14 BRPM | HEART RATE: 62 BPM | SYSTOLIC BLOOD PRESSURE: 112 MMHG | TEMPERATURE: 98.7 F | DIASTOLIC BLOOD PRESSURE: 60 MMHG | OXYGEN SATURATION: 98 %

## 2025-04-29 RX ORDER — CEFDINIR 300 MG/1
300 CAPSULE ORAL 2 TIMES DAILY
Qty: 18 CAPSULE | Refills: 0 | Status: SHIPPED | OUTPATIENT
Start: 2025-04-29 | End: 2025-05-08

## 2025-04-29 RX ORDER — ONDANSETRON 4 MG/1
4 TABLET, ORALLY DISINTEGRATING ORAL EVERY 6 HOURS PRN
Qty: 10 TABLET | Refills: 0 | Status: SHIPPED | OUTPATIENT
Start: 2025-04-29 | End: 2025-05-02

## 2025-04-29 NOTE — DISCHARGE INSTRUCTIONS
I recommend 60 mg of ibuprofen every 6 hours, 1000 mg of Tylenol every 6 hours however you can offset them by 3 hours you take Tylenol, 3 hours later ibuprofen, 3 hours later back to Tylenol and so and so forth.  You should drink plenty fluids to stay well-hydrated.  You should take the antibiotics as prescribed.  If you develop severe pain, fever, unable take the antibiotics, stay hydrated or concerning otherwise you should return here to the emergency department.  Otherwise I would follow-up with your regular doctor and with nephrology.

## 2025-04-29 NOTE — ED PROVIDER NOTES
Emergency Department Note      History of Present Illness     Chief Complaint   Abdominal Pain (RLQ)      HPI   Gatito Luna is a 25 year old male who presents to the ED with his sister for evaluation of abdominal pain. Patient reports that a couple of hours ago he started having significant central lower abdominal pain and groin pain. He feels like his bladder is full, but when he attempts to urinate nothing comes out. After a few attempts there was some red discharge that he believes was blood. There is pain in the tip of his penis when he tries to urinate. There is pain into his testicles. He has had a fair appetite all day, and no changes in bowel movements. He denies any history of sexual activity.    Independent Historian   None    Review of External Notes   None    Past Medical History     Medical History and Problem List   The patient denies any significant past medical history.    Medications   The patient is not currently taking any regular medications.     Physical Exam     Patient Vitals for the past 24 hrs:   BP Temp Temp src Pulse Resp SpO2   04/29/25 0035 112/60 -- -- 62 -- 98 %   04/28/25 2351 114/68 98.7  F (37.1  C) Oral 61 -- 100 %   04/28/25 2117 111/71 98.3  F (36.8  C) Oral 65 14 100 %   04/28/25 1819 137/84 98  F (36.7  C) Oral 76 22 100 %     Physical Exam  Constitutional: Alert, attentive, GCS 15   HENT:    Mouth/Throat: None  Eyes: EOM are normal, anicteric, conjugate gaze  CV: distal extremities warm, well perfused  Chest: Non-labored breathing on RA  GI:  mild right lower quadrant tenderness. No distension. No guarding or rebound.    : circumcised phallus, no blood at neatus, discharge, Mild right testicular tenderness  Neurological: Alert, attentive, moving all extremities equally.   Skin: Skin is warm and dry.     Diagnostics     Lab Results   Labs Ordered and Resulted from Time of ED Arrival to Time of ED Departure   COMPREHENSIVE METABOLIC PANEL - Abnormal       Result Value     Sodium 141      Potassium 3.9      Carbon Dioxide (CO2) 27      Anion Gap 12      Urea Nitrogen 11.3      Creatinine 1.03      GFR Estimate >90      Calcium 9.2      Chloride 102      Glucose 115 (*)     Alkaline Phosphatase 109      AST 44      ALT 41      Protein Total 7.6      Albumin 4.6      Bilirubin Total 0.5     URINE MACROSCOPIC WITH REFLEX TO MICRO - Abnormal    Color Urine Dark Yellow (*)     Appearance Urine Cloudy (*)     Glucose Urine Negative      Bilirubin Urine Negative      Ketones Urine 160 (*)     Specific Gravity Urine 1.015      Blood Urine Large (*)     pH Urine 8.0 (*)     Protein Albumin Urine Negative      Urobilinogen Urine 0.2      Nitrite Urine Negative      Leukocyte Esterase Urine Trace (*)     RBC Urine >100 (*)     WBC Urine 19 (*)     Mucus Urine Present (*)    CBC WITH PLATELETS AND DIFFERENTIAL - Abnormal    WBC Count 14.3 (*)     RBC Count 4.74      Hemoglobin 14.5      Hematocrit 41.0      MCV 87      MCH 30.6      MCHC 35.4      RDW 12.0      Platelet Count 250      % Neutrophils 87      % Lymphocytes 9      % Monocytes 3      % Eosinophils 0      % Basophils 0      % Immature Granulocytes 0      NRBCs per 100 WBC 0      Absolute Neutrophils 12.4 (*)     Absolute Lymphocytes 1.3      Absolute Monocytes 0.5      Absolute Eosinophils 0.0      Absolute Basophils 0.0      Absolute Immature Granulocytes 0.0      Absolute NRBCs 0.0     URINE CULTURE       Imaging   US Testicular & Scrotum w Doppler Ltd   Final Result   IMPRESSION:   1.  Normal ultrasound of the scrotum.      Abd/pelvis CT,  IV  contrast only TRAUMA / AAA   Final Result   IMPRESSION:       1.  Subtle findings in the right kidney/ureter including mild generalized hypoenhancement of the right kidney and minimal urothelial thickening/periureteral fat stranding of the right ureter, suspicious for underlying pyelonephritis. No renal or    perinephric abscess. Recommend correlation with urinalysis.          Independent  Interpretation   HyperCVAD his CT, see no notes of obstruction or clear hydronephrosis.    ED Course      Medications Administered   Medications   acetaminophen (TYLENOL) tablet 975 mg (975 mg Oral $Given 4/28/25 1957)   ketorolac (TORADOL) injection 15 mg (15 mg Intravenous $Given 4/28/25 2046)   sodium chloride 0.9% BOLUS 1,000 mL (0 mLs Intravenous Stopped 4/28/25 2217)   ondansetron (ZOFRAN) injection 4 mg (4 mg Intravenous $Given 4/28/25 2046)   iopamidol (ISOVUE-370) solution 75 mL (75 mLs Intravenous $Given 4/28/25 2129)   sodium chloride 0.9 % bag for CT scan flush (62 mLs Intravenous $Given 4/28/25 2129)   cefTRIAXone (ROCEPHIN) 2 g vial to attach to  ml bag for ADULTS or NS 50 ml bag for PEDS (0 g Intravenous Stopped 4/28/25 2352)       Procedures   Procedures     Discussion of Management   None    ED Course   ED Course as of 04/29/25 0043   Mon Apr 28, 2025 2033 I obtained the history and performed the examination as described.        Additional Documentation  None    Medical Decision Making / Diagnosis     CMS Diagnoses: IV Antibiotics given and/or elevated Lactate of 0 and no sepsis note found - Delete this reminder and enter the sepsis note or '.edcms' before signing chart.>>>None    MIPS       None    MDM   Gatito KOSTAS Luna is a 25 year old male without significant past medical history presenting for right-sided abdominal pain, urinary urgency and frequency with some dysuria, UA here is suspicious for possible UTI though is significantly bloody, certainly his symptoms are concerning for UTI.  He denies being sexually active or history of being sexually active.  Due to his degree of pain, CT imaging was obtained with greatest concern for potential appendicitis given his right lower quadrant tenderness versus potential ureteral stone, CT imaging demonstrates subtle changes concerning for potential pyelonephritis but no other acute etiologies, and testicular ultrasound was negative making orchitis  unlikely.  Patient was adamant he is not sexually active, urine culture was sent, he was given a dose of IV ceftriaxone as he does have mild leukocytosis however kidney function is within normal limits with lower suspicion for nephritis.  He does not have a fever here, he is able to tolerate p.o. and felt better after IV fluids and Toradol.  As such we will plan for discharge on oral antibiotics, with Zofran, recommendation of Tylenol, ibuprofen and PCP follow-up.  I will also place urology referral given his unclear why he would have potential stenting bladder infection.  Return precautions were reviewed including persistent fever, inability to tolerate antibiotics, severe pain.    Disposition   The patient was discharged.     Diagnosis     ICD-10-CM    1. Acute cystitis with hematuria  N30.01       2. Pyelonephritis  N12 Adult Urology  Referral           Discharge Medications   Discharge Medication List as of 4/29/2025 12:33 AM        START taking these medications    Details   cefdinir (OMNICEF) 300 MG capsule Take 1 capsule (300 mg) by mouth 2 times daily for 9 days., Disp-18 capsule, R-0, Local Print      ondansetron (ZOFRAN ODT) 4 MG ODT tab Take 1 tablet (4 mg) by mouth every 6 hours as needed for nausea or vomiting., Disp-10 tablet, R-0, Local Print           Jeffrey Garcia MD  [employer]  12:43 AM 04/29/25       Scribe Disclosure:  I, Binh Alonso, am serving as a scribe at 8:40 PM on 4/28/2025 to document services personally performed by Jeffrey Garcia MD based on my observations and the provider's statements to me.        Jeffrey Garcia MD  04/29/25 0043

## 2025-04-30 LAB — BACTERIA UR CULT: NORMAL

## 2025-05-01 ENCOUNTER — PATIENT OUTREACH (OUTPATIENT)
Dept: CARE COORDINATION | Facility: CLINIC | Age: 25
End: 2025-05-01